# Patient Record
Sex: FEMALE | Race: WHITE | URBAN - METROPOLITAN AREA
[De-identification: names, ages, dates, MRNs, and addresses within clinical notes are randomized per-mention and may not be internally consistent; named-entity substitution may affect disease eponyms.]

---

## 2022-02-28 ENCOUNTER — OFFICE VISIT (OUTPATIENT)
Dept: PRIMARY CARE CLINIC | Age: 37
End: 2022-02-28
Payer: COMMERCIAL

## 2022-02-28 VITALS
WEIGHT: 185 LBS | SYSTOLIC BLOOD PRESSURE: 124 MMHG | OXYGEN SATURATION: 98 % | DIASTOLIC BLOOD PRESSURE: 72 MMHG | HEIGHT: 66 IN | HEART RATE: 112 BPM | BODY MASS INDEX: 29.73 KG/M2 | TEMPERATURE: 97.1 F

## 2022-02-28 DIAGNOSIS — Z11.59 NEED FOR HEPATITIS C SCREENING TEST: ICD-10-CM

## 2022-02-28 DIAGNOSIS — R20.0 NUMBNESS AND TINGLING IN BOTH HANDS: ICD-10-CM

## 2022-02-28 DIAGNOSIS — Z00.00 ENCOUNTER FOR ANNUAL PHYSICAL EXAM: ICD-10-CM

## 2022-02-28 DIAGNOSIS — M72.2 PLANTAR FASCIITIS, BILATERAL: ICD-10-CM

## 2022-02-28 DIAGNOSIS — R20.2 NUMBNESS AND TINGLING IN BOTH HANDS: ICD-10-CM

## 2022-02-28 DIAGNOSIS — Z11.4 SCREENING FOR HIV (HUMAN IMMUNODEFICIENCY VIRUS): ICD-10-CM

## 2022-02-28 DIAGNOSIS — Z00.00 ENCOUNTER FOR ANNUAL PHYSICAL EXAM: Primary | ICD-10-CM

## 2022-02-28 DIAGNOSIS — M54.50 ACUTE BILATERAL LOW BACK PAIN WITHOUT SCIATICA: ICD-10-CM

## 2022-02-28 LAB
ANION GAP SERPL CALCULATED.3IONS-SCNC: 16 MMOL/L (ref 3–16)
BUN BLDV-MCNC: 14 MG/DL (ref 7–20)
CALCIUM SERPL-MCNC: 9.7 MG/DL (ref 8.3–10.6)
CHLORIDE BLD-SCNC: 100 MMOL/L (ref 99–110)
CHOLESTEROL, TOTAL: 206 MG/DL (ref 0–199)
CO2: 24 MMOL/L (ref 21–32)
CREAT SERPL-MCNC: 0.8 MG/DL (ref 0.6–1.1)
GFR AFRICAN AMERICAN: >60
GFR NON-AFRICAN AMERICAN: >60
GLUCOSE BLD-MCNC: 85 MG/DL (ref 70–99)
HDLC SERPL-MCNC: 39 MG/DL (ref 40–60)
HEPATITIS C ANTIBODY INTERPRETATION: NORMAL
LDL CHOLESTEROL CALCULATED: 145 MG/DL
POTASSIUM SERPL-SCNC: 4.4 MMOL/L (ref 3.5–5.1)
SODIUM BLD-SCNC: 140 MMOL/L (ref 136–145)
TRIGL SERPL-MCNC: 112 MG/DL (ref 0–150)
VLDLC SERPL CALC-MCNC: 22 MG/DL

## 2022-02-28 PROCEDURE — 99385 PREV VISIT NEW AGE 18-39: CPT | Performed by: STUDENT IN AN ORGANIZED HEALTH CARE EDUCATION/TRAINING PROGRAM

## 2022-02-28 SDOH — ECONOMIC STABILITY: FOOD INSECURITY: WITHIN THE PAST 12 MONTHS, YOU WORRIED THAT YOUR FOOD WOULD RUN OUT BEFORE YOU GOT MONEY TO BUY MORE.: NEVER TRUE

## 2022-02-28 SDOH — ECONOMIC STABILITY: FOOD INSECURITY: WITHIN THE PAST 12 MONTHS, THE FOOD YOU BOUGHT JUST DIDN'T LAST AND YOU DIDN'T HAVE MONEY TO GET MORE.: NEVER TRUE

## 2022-02-28 ASSESSMENT — PATIENT HEALTH QUESTIONNAIRE - PHQ9
SUM OF ALL RESPONSES TO PHQ QUESTIONS 1-9: 1
SUM OF ALL RESPONSES TO PHQ9 QUESTIONS 1 & 2: 1
SUM OF ALL RESPONSES TO PHQ QUESTIONS 1-9: 1
2. FEELING DOWN, DEPRESSED OR HOPELESS: 1
1. LITTLE INTEREST OR PLEASURE IN DOING THINGS: 0

## 2022-02-28 ASSESSMENT — SOCIAL DETERMINANTS OF HEALTH (SDOH): HOW HARD IS IT FOR YOU TO PAY FOR THE VERY BASICS LIKE FOOD, HOUSING, MEDICAL CARE, AND HEATING?: NOT HARD AT ALL

## 2022-02-28 NOTE — PROGRESS NOTES
2022    Jose Xiao (:  1985) is a 39 y.o. female, here for a preventive medicine evaluation. There is no problem list on file for this patient. No chronic illnesses has 5 kids  is peds anesthesiologist.   Couple months ago pain in both hands, also when she steps out of bed in morning pain in both feet,   Dad had carpal tunnel  Wears wrist braces at night and finger pain went away,   Numbness in both wrists    Review of Systems   All other systems reviewed and are negative. Prior to Visit Medications    Medication Sig Taking? Authorizing Provider   levonorgestrel (MIRENA, 52 MG,) IUD 52 mg 1 each by IntraUTERine route once Yes Historical Provider, MD        No Known Allergies    History reviewed. No pertinent past medical history. History reviewed. No pertinent surgical history. Social History     Socioeconomic History    Marital status: Not on file     Spouse name: Not on file    Number of children: Not on file    Years of education: Not on file    Highest education level: Not on file   Occupational History    Not on file   Tobacco Use    Smoking status: Never Smoker    Smokeless tobacco: Never Used   Vaping Use    Vaping Use: Never used   Substance and Sexual Activity    Alcohol use: Not Currently    Drug use: Not Currently    Sexual activity: Not on file   Other Topics Concern    Not on file   Social History Narrative    Not on file     Social Determinants of Health     Financial Resource Strain: Low Risk     Difficulty of Paying Living Expenses: Not hard at all   Food Insecurity: No Food Insecurity    Worried About [address] in the Last Year: Never true    [address] in the Last Year: Never true   Transportation Needs:     Lack of Transportation (Medical): Not on file    Lack of Transportation (Non-Medical):  Not on file   Physical Activity:     Days of Exercise per Week: Not on file    Minutes of Exercise per Session: Not on file Stress:     Feeling of Stress : Not on file   Social Connections:     Frequency of Communication with Friends and Family: Not on file    Frequency of Social Gatherings with Friends and Family: Not on file    Attends Mosque Services: Not on file    Active Member of Clubs or Organizations: Not on file    Attends Club or Organization Meetings: Not on file    Marital Status: Not on file   Intimate Partner Violence:     Fear of Current or Ex-Partner: Not on file    Emotionally Abused: Not on file    Physically Abused: Not on file    Sexually Abused: Not on file   Housing Stability:     Unable to Pay for Housing in the Last Year: Not on file    Number of [place] in the Last Year: Not on file    Unstable Housing in the Last Year: Not on file        Family History   Problem Relation Age of Onset    Hypertension Father     Other Father        ADVANCE DIRECTIVE: N, <no information>    Vitals:    02/28/22 0935   BP: 124/72   Site: Left Upper Arm   Position: Sitting   Cuff Size: Medium Adult   Pulse: 112   Temp: 97.1 °F (36.2 °C)   TempSrc: Infrared   SpO2: 98%   Weight: 185 lb (83.9 kg)   Height: 5' 6\" (1.676 m)     Estimated body mass index is 29.86 kg/m² as calculated from the following:    Height as of this encounter: 5' 6\" (1.676 m). Weight as of this encounter: 185 lb (83.9 kg). Physical Exam  Vitals reviewed. Constitutional:       General: She is not in acute distress. Appearance: Normal appearance. She is not ill-appearing. HENT:      Head: Normocephalic and atraumatic. Right Ear: Tympanic membrane, ear canal and external ear normal.      Left Ear: Tympanic membrane, ear canal and external ear normal.      Nose: Nose normal. No rhinorrhea. Mouth/Throat:      Mouth: Mucous membranes are moist.      Pharynx: Oropharynx is clear. No oropharyngeal exudate. Eyes:      General: No scleral icterus. Right eye: No discharge. Left eye: No discharge. Extraocular Movements: Extraocular movements intact. Conjunctiva/sclera: Conjunctivae normal.   Cardiovascular:      Rate and Rhythm: Normal rate and regular rhythm. Pulses: Normal pulses. Heart sounds: Normal heart sounds. No murmur heard. No gallop. Pulmonary:      Effort: Pulmonary effort is normal.      Breath sounds: Normal breath sounds. No wheezing, rhonchi or rales. Abdominal:      General: Abdomen is flat. Bowel sounds are normal. There is no distension. Palpations: Abdomen is soft. There is no mass. Musculoskeletal:         General: Normal range of motion. Cervical back: Neck supple. No muscular tenderness. Comments: TTP along plantar fascia bilateral feet  Tight heel cords bilaterally    TTP paralumbar region  Negative straight leg raise bilaterally  Hip flexion right hip to 70 degrees  Hip flexion left hip greater 90 degrees  Normal hip abduction/abduction  Negative logroll bilaterally   Lymphadenopathy:      Cervical: No cervical adenopathy. Skin:     General: Skin is warm. Capillary Refill: Capillary refill takes less than 2 seconds. Findings: No rash. Neurological:      General: No focal deficit present. Mental Status: She is alert. Cranial Nerves: No cranial nerve deficit. Psychiatric:         Mood and Affect: Mood normal.         Behavior: Behavior normal.         No flowsheet data found. No results found for: CHOL, CHOLFAST, TRIG, TRIGLYCFAST, HDL, LDLCHOLESTEROL, LDLCALC, GLUF, GLUCOSE, LABA1C    The ASCVD Risk score (Fawn Bragg., et al., 2013) failed to calculate for the following reasons:     The 2013 ASCVD risk score is only valid for ages 36 to 78    Immunization History   Administered Date(s) Administered    COVID-19, Sima Lopez, Primary or Immunocompromised, PF, 100mcg/0.5mL 04/03/2021, 05/01/2021       Health Maintenance   Topic Date Due    Hepatitis C screen  Never done    Varicella vaccine (1 of 2 - 2-dose childhood series) Never done    Depression Screen  Never done    HIV screen  Never done    Cervical cancer screen  Never done    Flu vaccine (1) Never done    COVID-19 Vaccine (3 - Booster for Moderna series) 10/01/2021    DTaP/Tdap/Td vaccine (3 - Td or Tdap) 02/18/2030    Hepatitis A vaccine  Aged Out    Hepatitis B vaccine  Aged Out    Hib vaccine  Aged Out    Meningococcal (ACWY) vaccine  Aged Out    Pneumococcal 0-64 years Vaccine  Aged Out       Assessment & Plan   Encounter for annual physical exam  -     Basic Metabolic Panel; Future  -     Lipid Panel; Future  Screening for HIV (human immunodeficiency virus)  -     HIV Screen; Future  Need for hepatitis C screening test  -     Hepatitis C Antibody; Future  Numbness and tingling in both hands  Chronic and worsening  Likely secondary to carpal tunnel  -     [facility] - Clay Lechuga MD (Inpatient and Outpatient EMG), [facility]  Plantar fasciitis, bilateral  Acute  NSAIDs, ice, stretch heel cords    Acute bilateral low back pain without sciatica  Gave handout for low back and hip conditioning program.  NSAIDs, heating pad if no improvement a month follow-up    Return in about 4 weeks (around 3/28/2022), or if symptoms worsen or fail to improve.          --Todd Martinez MD

## 2022-03-01 LAB
HIV AG/AB: NORMAL
HIV ANTIGEN: NORMAL
HIV-1 ANTIBODY: NORMAL
HIV-2 AB: NORMAL

## 2022-03-17 ENCOUNTER — TELEPHONE (OUTPATIENT)
Dept: PRIMARY CARE CLINIC | Age: 37
End: 2022-03-17

## 2022-03-17 DIAGNOSIS — E78.2 MIXED HYPERLIPIDEMIA: Primary | ICD-10-CM

## 2022-03-31 ENCOUNTER — PROCEDURE VISIT (OUTPATIENT)
Dept: NEUROLOGY | Age: 37
End: 2022-03-31
Payer: COMMERCIAL

## 2022-03-31 DIAGNOSIS — G56.23 ENTRAPMENT OF BOTH ULNAR NERVES AT ELBOW: Primary | ICD-10-CM

## 2022-03-31 PROCEDURE — 95886 MUSC TEST DONE W/N TEST COMP: CPT | Performed by: PSYCHIATRY & NEUROLOGY

## 2022-03-31 PROCEDURE — 95911 NRV CNDJ TEST 9-10 STUDIES: CPT | Performed by: PSYCHIATRY & NEUROLOGY

## 2022-03-31 NOTE — PATIENT INSTRUCTIONS
Verbal consent was obtained from patient and/or patient's advocate for in office procedure with Dr. Victor M Carreno (EMG or EEG).

## 2022-03-31 NOTE — PROGRESS NOTES
Elmer Antoine M.D. [facility]) Physicians/[facility]  Board Certified in [address], [address] [place] [address]    EMG / NERVE CONDUCTION STUDY      PATIENT:  Tim Dorsey       DATE OF EM22     YOB: 1985       REASON FOR EMG:   Bilateral arm numbness      REFERRING PHYSICIAN:  Sona Talley MD  [address] [place] [address],  [facility]     SUMMARY:   Bilateral median motor nerve studies were normal.  Bilateral median sensory nerve studies were normal.  Bilateral ulnar motor nerve studies with slowing of conduction velocities across the elbow. Bilateral ulnar sensory nerve studies were normal.  The left radial sensory nerve study was normal.  Needle EMG of bilateral upper extremities was normal.      CLINICAL DIAGNOSIS:  Unspecified neuropathy        EMG RESULTS:   This patient has mild bilateral ulnar nerve lesions at the elbow. The right side is slightly more involved when compared to the left side. ---------------------------------------------  Elmer Antoine M.D.   Electromyographer / Neurologist

## 2022-04-06 ENCOUNTER — OFFICE VISIT (OUTPATIENT)
Dept: PRIMARY CARE CLINIC | Age: 37
End: 2022-04-06
Payer: COMMERCIAL

## 2022-04-06 VITALS
TEMPERATURE: 97.1 F | HEART RATE: 77 BPM | DIASTOLIC BLOOD PRESSURE: 74 MMHG | OXYGEN SATURATION: 99 % | SYSTOLIC BLOOD PRESSURE: 122 MMHG | BODY MASS INDEX: 29.38 KG/M2 | WEIGHT: 182 LBS

## 2022-04-06 DIAGNOSIS — G56.23 CUBITAL TUNNEL SYNDROME OF BOTH UPPER EXTREMITIES: ICD-10-CM

## 2022-04-06 DIAGNOSIS — K64.9 HEMORRHOIDS, UNSPECIFIED HEMORRHOID TYPE: Primary | ICD-10-CM

## 2022-04-06 PROCEDURE — 99213 OFFICE O/P EST LOW 20 MIN: CPT | Performed by: STUDENT IN AN ORGANIZED HEALTH CARE EDUCATION/TRAINING PROGRAM

## 2022-04-06 NOTE — PROGRESS NOTES
Rosie Aragon (:  1985) is a 39 y.o. female,Established patient, here for evaluation of the following chief complaint(s):  Hemorrhoids         ASSESSMENT/PLAN:  1. Hemorrhoids, unspecified hemorrhoid type  Assessment & Plan:   External hemorrhoid noted on exam today was not thrombosed did not notice any evidence of infection either  Can use conservative management  She will let me know if she wants referral to colorectal surgeon  2. Cubital tunnel syndrome of both upper extremities  Assessment & Plan:   Reviewed EMG with patient today  Showed mild ulnar nerve lesions bilaterally  Discussed different options, recommend splinting at night with Ace wraps, not leaning on elbows, sleep with arms above head etc.  Patient will try this and if no improvement she will call me and I will put in referral for hand surgeon      Return in about 4 weeks (around 2022), or if symptoms worsen or fail to improve. Subjective   SUBJECTIVE/OBJECTIVE:  HPI    Has had 10 days of hemorrhoids and usually will pop up after sex, \"doggy style\" or if legs are back over head, no constipation, states anal sex is infrequent. Tried to push it back in and this made it worse, was using preparation H for one week and some improvement. No bleeding or itching. Also had EMG done still having moderate pain and weakness in bilateral hands especially with holding phone or with gripping. Review of Systems   All other systems reviewed and are negative. Objective   Physical Exam  Vitals reviewed. Constitutional:       General: She is not in acute distress. Appearance: Normal appearance. She is not ill-appearing. HENT:      Head: Normocephalic and atraumatic. Right Ear: External ear normal.      Left Ear: External ear normal.   Eyes:      General: No scleral icterus. Right eye: No discharge. Left eye: No discharge. Extraocular Movements: Extraocular movements intact. Conjunctiva/sclera: Conjunctivae normal.   Pulmonary:      Effort: Pulmonary effort is normal. No respiratory distress. Genitourinary:     Comments: Nonthrombosed small external hemorrhoid at 6 o'clock position  Small internal hemorrhoid noted at the 3 o'clock position  No gross blood or stool  Musculoskeletal:      Cervical back: Neck supple. Skin:     Coloration: Skin is not jaundiced. Findings: No lesion or rash. Neurological:      Mental Status: She is alert. Cranial Nerves: No cranial nerve deficit. Coordination: Coordination normal.   Psychiatric:         Mood and Affect: Mood normal.     Chaperone was offered patient declined. An electronic signature was used to authenticate this note.     --Hubert Mendez MD

## 2022-04-06 NOTE — ASSESSMENT & PLAN NOTE
Reviewed EMG with patient today  Showed mild ulnar nerve lesions bilaterally  Discussed different options, recommend splinting at night with Ace wraps, not leaning on elbows, sleep with arms above head etc.  Patient will try this and if no improvement she will call me and I will put in referral for hand surgeon

## 2022-04-06 NOTE — ASSESSMENT & PLAN NOTE
External hemorrhoid noted on exam today was not thrombosed did not notice any evidence of infection either  Can use conservative management  She will let me know if she wants referral to colorectal surgeon

## 2022-12-22 ENCOUNTER — OFFICE VISIT (OUTPATIENT)
Dept: PRIMARY CARE CLINIC | Age: 37
End: 2022-12-22
Payer: COMMERCIAL

## 2022-12-22 VITALS
WEIGHT: 176.2 LBS | OXYGEN SATURATION: 96 % | BODY MASS INDEX: 28.44 KG/M2 | HEART RATE: 82 BPM | SYSTOLIC BLOOD PRESSURE: 102 MMHG | DIASTOLIC BLOOD PRESSURE: 76 MMHG | TEMPERATURE: 97.2 F

## 2022-12-22 DIAGNOSIS — R41.840 ATTENTION DEFICIT: ICD-10-CM

## 2022-12-22 DIAGNOSIS — R41.840 ATTENTION DEFICIT: Primary | ICD-10-CM

## 2022-12-22 LAB
BASOPHILS ABSOLUTE: 0 K/UL (ref 0–0.2)
BASOPHILS RELATIVE PERCENT: 0.3 %
EOSINOPHILS ABSOLUTE: 0.1 K/UL (ref 0–0.6)
EOSINOPHILS RELATIVE PERCENT: 1.9 %
FERRITIN: 59.1 NG/ML (ref 15–150)
HCT VFR BLD CALC: 38.8 % (ref 36–48)
HEMOGLOBIN: 13.1 G/DL (ref 12–16)
IRON SATURATION: 28 % (ref 15–50)
IRON: 88 UG/DL (ref 37–145)
LYMPHOCYTES ABSOLUTE: 2 K/UL (ref 1–5.1)
LYMPHOCYTES RELATIVE PERCENT: 27.2 %
MCH RBC QN AUTO: 30.2 PG (ref 26–34)
MCHC RBC AUTO-ENTMCNC: 33.8 G/DL (ref 31–36)
MCV RBC AUTO: 89.4 FL (ref 80–100)
MONOCYTES ABSOLUTE: 0.3 K/UL (ref 0–1.3)
MONOCYTES RELATIVE PERCENT: 4.7 %
NEUTROPHILS ABSOLUTE: 4.7 K/UL (ref 1.7–7.7)
NEUTROPHILS RELATIVE PERCENT: 65.9 %
PDW BLD-RTO: 13.3 % (ref 12.4–15.4)
PLATELET # BLD: 264 K/UL (ref 135–450)
PMV BLD AUTO: 9.3 FL (ref 5–10.5)
RBC # BLD: 4.34 M/UL (ref 4–5.2)
T4 FREE: 1.2 NG/DL (ref 0.9–1.8)
TOTAL IRON BINDING CAPACITY: 320 UG/DL (ref 260–445)
TSH SERPL DL<=0.05 MIU/L-ACNC: 1.06 UIU/ML (ref 0.27–4.2)
WBC # BLD: 7.2 K/UL (ref 4–11)

## 2022-12-22 PROCEDURE — 99213 OFFICE O/P EST LOW 20 MIN: CPT | Performed by: STUDENT IN AN ORGANIZED HEALTH CARE EDUCATION/TRAINING PROGRAM

## 2022-12-22 RX ORDER — ICOSAPENT ETHYL 1000 MG/1
2 CAPSULE ORAL 2 TIMES DAILY
Qty: 60 CAPSULE | Refills: 3 | Status: SHIPPED | OUTPATIENT
Start: 2022-12-22 | End: 2022-12-22

## 2022-12-22 NOTE — PROGRESS NOTES
Mirella Benítez (:  1985) is a 40 y.o. female,Established patient, here for evaluation of the following chief complaint(s):  ADHD (Patient has a suspicion of ADHD. Patient has been researching for her son, thus she believes she has a lot of the symptoms. )         ASSESSMENT/PLAN:  1. Attention deficit  -     CBC with Auto Differential; Future  -     TSH; Future  -     T4, Free; Future  -     Ferritin; Future  -     Iron and TIBC; Future  Likely related to anxiety but will need formal eval and likely has ADHD as well. Gave list of places that do formal ADHD evals. Can f/u after    Patient is aware that they will need to have a formal evaluation for ADHD  Discussed today that treatment for ADHD typically are stimulants, risks versus benefits gone over with patient today. Patient is aware that they will need to sign a drug contract with this office to be updated yearly, drug screen is required before medication is started and at least yearly but we reserve the right to drug screen randomly. Once formal evaluation is received and reviewed and drug screen appropriate, can start medication at that time. Patient is aware that they will need to follow-up 1 month after starting medication and at least every 3 months for medication refills. Patient is also aware that if any medication regimen is changed/adjusted they will need to follow-up monthly. Return for after adhd eval.         Subjective   SUBJECTIVE/OBJECTIVE:  HPI    Loses keys   Procrastinates, will worry about something and put it off until she doesn't do it at all  Loses every day items  Mind frequently wanders  Cannot focus during meetings  Sometimes trouble falling asleep due to things running through head    Review of Systems   All other systems reviewed and are negative. Objective   Physical Exam  Vitals reviewed. Constitutional:       General: She is not in acute distress. Appearance: Normal appearance.  She is not ill-appearing. HENT:      Head: Normocephalic and atraumatic. Right Ear: External ear normal.      Left Ear: External ear normal.   Eyes:      General: No scleral icterus. Right eye: No discharge. Left eye: No discharge. Extraocular Movements: Extraocular movements intact. Conjunctiva/sclera: Conjunctivae normal.   Pulmonary:      Effort: Pulmonary effort is normal. No respiratory distress. Musculoskeletal:      Cervical back: Neck supple. Skin:     Coloration: Skin is not jaundiced. Findings: No lesion or rash. Neurological:      Mental Status: She is alert. Cranial Nerves: No cranial nerve deficit. Coordination: Coordination normal.   Psychiatric:         Mood and Affect: Mood normal.                An electronic signature was used to authenticate this note.     --Mercy Walton MD

## 2022-12-22 NOTE — PATIENT INSTRUCTIONS
[address]  [address] Etienne  Antony Umana  Phone: [phone] [facility]   Accepts most insurances   Address: [address] [place] [address], [place] [address]   Phone: [phone]     [facility]   Often times self-pay and can be expensive, so ask about cost   [address] [place], [facility]   Phone: [phone] [facility]   [address]   [phone]     Pipe Bermudez, Ph.D.   [facility] [place] [address]   [phone]   Website does not mention insurances accepted       [address] OFFICE  [address]  Phone: [phone]     Fax: [address]  40 [facility],   [address] [place] [address]  Brigido Umana 3  Phone: [phone]     Fax: [phone]    [employer]  Call: [phone]  Large organization in numerous states   Easier to book online rather than call  Make sure to find therapist who does ADHD formal evaluations

## 2023-04-03 ENCOUNTER — TELEMEDICINE (OUTPATIENT)
Dept: PRIMARY CARE CLINIC | Age: 38
End: 2023-04-03
Payer: COMMERCIAL

## 2023-04-03 DIAGNOSIS — J06.9 UPPER RESPIRATORY TRACT INFECTION, UNSPECIFIED TYPE: Primary | ICD-10-CM

## 2023-04-03 PROCEDURE — 99213 OFFICE O/P EST LOW 20 MIN: CPT | Performed by: STUDENT IN AN ORGANIZED HEALTH CARE EDUCATION/TRAINING PROGRAM

## 2023-04-03 RX ORDER — AZITHROMYCIN 250 MG/1
250 TABLET, FILM COATED ORAL SEE ADMIN INSTRUCTIONS
Qty: 6 TABLET | Refills: 0 | Status: SHIPPED | OUTPATIENT
Start: 2023-04-03 | End: 2023-04-08

## 2023-04-03 RX ORDER — ALBUTEROL SULFATE 90 UG/1
2 AEROSOL, METERED RESPIRATORY (INHALATION) EVERY 6 HOURS PRN
Qty: 18 G | Refills: 3 | Status: SHIPPED | OUTPATIENT
Start: 2023-04-03

## 2023-04-03 RX ORDER — BROMPHENIRAMINE MALEATE, PSEUDOEPHEDRINE HYDROCHLORIDE, AND DEXTROMETHORPHAN HYDROBROMIDE 2; 30; 10 MG/5ML; MG/5ML; MG/5ML
10 SYRUP ORAL 4 TIMES DAILY PRN
Qty: 200 ML | Refills: 0 | Status: SHIPPED | OUTPATIENT
Start: 2023-04-03

## 2023-04-03 SDOH — ECONOMIC STABILITY: FOOD INSECURITY: WITHIN THE PAST 12 MONTHS, YOU WORRIED THAT YOUR FOOD WOULD RUN OUT BEFORE YOU GOT MONEY TO BUY MORE.: NEVER TRUE

## 2023-04-03 SDOH — ECONOMIC STABILITY: INCOME INSECURITY: HOW HARD IS IT FOR YOU TO PAY FOR THE VERY BASICS LIKE FOOD, HOUSING, MEDICAL CARE, AND HEATING?: NOT HARD AT ALL

## 2023-04-03 SDOH — ECONOMIC STABILITY: TRANSPORTATION INSECURITY
IN THE PAST 12 MONTHS, HAS LACK OF TRANSPORTATION KEPT YOU FROM MEETINGS, WORK, OR FROM GETTING THINGS NEEDED FOR DAILY LIVING?: NO

## 2023-04-03 SDOH — ECONOMIC STABILITY: FOOD INSECURITY: WITHIN THE PAST 12 MONTHS, THE FOOD YOU BOUGHT JUST DIDN'T LAST AND YOU DIDN'T HAVE MONEY TO GET MORE.: NEVER TRUE

## 2023-04-03 SDOH — ECONOMIC STABILITY: HOUSING INSECURITY
IN THE LAST 12 MONTHS, WAS THERE A TIME WHEN YOU DID NOT HAVE A STEADY PLACE TO SLEEP OR SLEPT IN A SHELTER (INCLUDING NOW)?: NO

## 2023-04-03 NOTE — PROGRESS NOTES
Cynthia Shelley (:  1985) is a Established patient, here for evaluation of the following:    Assessment & Plan   Below is the assessment and plan developed based on review of pertinent history, physical exam, labs, studies, and medications. 1. Upper respiratory tract infection, unspecified type  -     azithromycin (ZITHROMAX) 250 MG tablet; Take 1 tablet by mouth See Admin Instructions for 5 days 500mg on day 1 followed by 250mg on days 2 - 5, Disp-6 tablet, R-0Normal  -     brompheniramine-pseudoephedrine-DM 2-30-10 MG/5ML syrup; Take 10 mLs by mouth 4 times daily as needed for Cough, Disp-200 mL, R-0Normal  -     albuterol sulfate HFA (PROVENTIL HFA) 108 (90 Base) MCG/ACT inhaler; Inhale 2 puffs into the lungs every 6 hours as needed for Wheezing, Disp-18 g, R-3Normal    Return if symptoms worsen or fail to improve. Subjective   HPI    Friday was having trouble breathing, has been sick on and off since Xmas, tons of congestion and cough.  made her breathe through a straw and it helped. Review of Systems   All other systems reviewed and are negative. Objective   Patient-Reported Vitals  No data recorded     Physical Exam  Vitals reviewed. Constitutional:       General: She is not in acute distress. Appearance: Normal appearance. She is not ill-appearing. HENT:      Head: Normocephalic and atraumatic. Right Ear: External ear normal.      Left Ear: External ear normal.   Eyes:      General: No scleral icterus. Right eye: No discharge. Left eye: No discharge. Extraocular Movements: Extraocular movements intact. Conjunctiva/sclera: Conjunctivae normal.   Pulmonary:      Effort: Pulmonary effort is normal. No respiratory distress. Musculoskeletal:      Cervical back: Neck supple. Skin:     Coloration: Skin is not jaundiced. Findings: No lesion or rash. Neurological:      Mental Status: She is alert.       Cranial Nerves: No cranial

## 2023-05-12 ENCOUNTER — OFFICE VISIT (OUTPATIENT)
Dept: PRIMARY CARE CLINIC | Age: 38
End: 2023-05-12
Payer: COMMERCIAL

## 2023-05-12 ENCOUNTER — HOSPITAL ENCOUNTER (OUTPATIENT)
Dept: VASCULAR LAB | Age: 38
Discharge: HOME | End: 2023-05-12
Payer: COMMERCIAL

## 2023-05-12 VITALS
HEIGHT: 66 IN | WEIGHT: 176 LBS | DIASTOLIC BLOOD PRESSURE: 70 MMHG | BODY MASS INDEX: 28.28 KG/M2 | OXYGEN SATURATION: 99 % | RESPIRATION RATE: 16 BRPM | TEMPERATURE: 96.8 F | SYSTOLIC BLOOD PRESSURE: 108 MMHG | HEART RATE: 99 BPM

## 2023-05-12 DIAGNOSIS — M79.89 PAIN AND SWELLING OF LEFT LOWER LEG: ICD-10-CM

## 2023-05-12 DIAGNOSIS — M79.662 PAIN AND SWELLING OF LEFT LOWER LEG: ICD-10-CM

## 2023-05-12 DIAGNOSIS — M79.89 PAIN AND SWELLING OF LEFT LOWER LEG: Primary | ICD-10-CM

## 2023-05-12 DIAGNOSIS — S80.12XA CONTUSION OF LEFT LOWER LEG, INITIAL ENCOUNTER: ICD-10-CM

## 2023-05-12 DIAGNOSIS — M79.662 PAIN AND SWELLING OF LEFT LOWER LEG: Primary | ICD-10-CM

## 2023-05-12 PROCEDURE — 93971 EXTREMITY STUDY: CPT

## 2023-05-12 PROCEDURE — 99214 OFFICE O/P EST MOD 30 MIN: CPT | Performed by: STUDENT IN AN ORGANIZED HEALTH CARE EDUCATION/TRAINING PROGRAM

## 2023-05-12 ASSESSMENT — PATIENT HEALTH QUESTIONNAIRE - PHQ9
SUM OF ALL RESPONSES TO PHQ QUESTIONS 1-9: 0
2. FEELING DOWN, DEPRESSED OR HOPELESS: 0
SUM OF ALL RESPONSES TO PHQ9 QUESTIONS 1 & 2: 0
SUM OF ALL RESPONSES TO PHQ QUESTIONS 1-9: 0
1. LITTLE INTEREST OR PLEASURE IN DOING THINGS: 0
SUM OF ALL RESPONSES TO PHQ QUESTIONS 1-9: 0
SUM OF ALL RESPONSES TO PHQ QUESTIONS 1-9: 0

## 2023-05-12 NOTE — PROGRESS NOTES
[address] [facility]  [place] [address] [address] [facility] [address]  Dept: [phone]  Dept Fax: [phone]  [address] [address] [facility] Daquan is a 40 y.o. female who presents today for:  Chief Complaint   Patient presents with    Leg Swelling     Possible blood clot, left leg and bruising. HPI:   Karen Bernstein is 40 y.o. with a history of cubital tunnel syndrome who presents today for left lower leg swelling that started yesterday. Red, painful, swollen. No history of DVT/PE. No SOB. Not on OCP. Not a smoker. No family history of DVT.        Objective:     Vitals:    05/12/23 1336   BP: 108/70   Pulse: 99   Resp: 16   Temp: 96.8 °F (36 °C)   SpO2: 99%         Wt Readings from Last 3 Encounters:   05/12/23 176 lb (79.8 kg)   12/22/22 176 lb 3.2 oz (79.9 kg)   04/06/22 182 lb (82.6 kg)       BP Readings from Last 3 Encounters:   05/12/23 108/70   12/22/22 102/76   04/06/22 122/74       Lab Results   Component Value Date    WBC 7.2 12/22/2022    HGB 13.1 12/22/2022    HCT 38.8 12/22/2022    MCV 89.4 12/22/2022     12/22/2022     Lab Results   Component Value Date     02/28/2022    K 4.4 02/28/2022     02/28/2022    CO2 24 02/28/2022    BUN 14 02/28/2022    CREATININE 0.8 02/28/2022    GLUCOSE 85 02/28/2022    CALCIUM 9.7 02/28/2022    LABGLOM >60 02/28/2022    GFRAA >60 02/28/2022     Lab Results   Component Value Date    TSH 1.06 12/22/2022    T4FREE 1.2 12/22/2022     No results found for: LABA1C  No results found for: EAG  Lab Results   Component Value Date    CHOL 206 (H) 02/28/2022     Lab Results   Component Value Date    TRIG 112 02/28/2022     Lab Results   Component Value Date    HDL 39 (L) 02/28/2022     Lab Results   Component Value Date    LDLCALC 145 (H) 02/28/2022     No results found for: PSA, PSADIA  No results found for: [id number]  No results found for: VITD25       No results found for: LABMICR, [id number]        Extensive

## 2023-05-17 ENCOUNTER — PATIENT MESSAGE (OUTPATIENT)
Dept: PRIMARY CARE CLINIC | Age: 38
End: 2023-05-17

## 2023-05-17 NOTE — TELEPHONE ENCOUNTER
From: Sabine Escobar  To: Dr. Ricci Lipps: 5/17/2023 11:05 AM EDT  Subject: Follow up    Hi there! I'm just checking in to see if I was supposed to go to the lab? I didn't get a mychart notification that anything was ordered so I just wanted to be sure. My leg is definitely looking better. The bruise is slowly improving and there has been no new bruising. There is still pain at the initial site. I appreciate your input. Thanks!     Brittany Waterman

## 2024-01-10 ENCOUNTER — OFFICE VISIT (OUTPATIENT)
Dept: PRIMARY CARE CLINIC | Age: 39
End: 2024-01-10
Payer: COMMERCIAL

## 2024-01-10 ENCOUNTER — HOSPITAL ENCOUNTER (OUTPATIENT)
Dept: GENERAL RADIOLOGY | Age: 39
Discharge: HOME | End: 2024-01-10
Payer: COMMERCIAL

## 2024-01-10 VITALS
TEMPERATURE: 97.1 F | BODY MASS INDEX: 29.87 KG/M2 | SYSTOLIC BLOOD PRESSURE: 116 MMHG | HEART RATE: 99 BPM | WEIGHT: 185.05 LBS | OXYGEN SATURATION: 98 % | DIASTOLIC BLOOD PRESSURE: 78 MMHG

## 2024-01-10 DIAGNOSIS — G89.29 CHRONIC LEFT SHOULDER PAIN: Primary | ICD-10-CM

## 2024-01-10 DIAGNOSIS — M25.512 CHRONIC LEFT SHOULDER PAIN: ICD-10-CM

## 2024-01-10 DIAGNOSIS — M25.512 CHRONIC LEFT SHOULDER PAIN: Primary | ICD-10-CM

## 2024-01-10 DIAGNOSIS — G89.29 CHRONIC LEFT SHOULDER PAIN: ICD-10-CM

## 2024-01-10 DIAGNOSIS — G89.29 CHRONIC BILATERAL LOW BACK PAIN WITHOUT SCIATICA: ICD-10-CM

## 2024-01-10 DIAGNOSIS — Z23 NEED FOR INFLUENZA VACCINATION: ICD-10-CM

## 2024-01-10 DIAGNOSIS — M54.50 CHRONIC BILATERAL LOW BACK PAIN WITHOUT SCIATICA: ICD-10-CM

## 2024-01-10 PROCEDURE — 73030 X-RAY EXAM OF SHOULDER: CPT

## 2024-01-10 PROCEDURE — 90674 CCIIV4 VAC NO PRSV 0.5 ML IM: CPT | Performed by: STUDENT IN AN ORGANIZED HEALTH CARE EDUCATION/TRAINING PROGRAM

## 2024-01-10 PROCEDURE — 90471 IMMUNIZATION ADMIN: CPT | Performed by: STUDENT IN AN ORGANIZED HEALTH CARE EDUCATION/TRAINING PROGRAM

## 2024-01-10 PROCEDURE — 99213 OFFICE O/P EST LOW 20 MIN: CPT | Performed by: STUDENT IN AN ORGANIZED HEALTH CARE EDUCATION/TRAINING PROGRAM

## 2024-01-10 RX ORDER — RUXOLITINIB 15 MG/G
CREAM TOPICAL
COMMUNITY
Start: 2024-01-07

## 2024-01-10 ASSESSMENT — PATIENT HEALTH QUESTIONNAIRE - PHQ9
SUM OF ALL RESPONSES TO PHQ9 QUESTIONS 1 & 2: 0
2. FEELING DOWN, DEPRESSED OR HOPELESS: 0
SUM OF ALL RESPONSES TO PHQ QUESTIONS 1-9: 0
SUM OF ALL RESPONSES TO PHQ QUESTIONS 1-9: 0
1. LITTLE INTEREST OR PLEASURE IN DOING THINGS: 0
SUM OF ALL RESPONSES TO PHQ QUESTIONS 1-9: 0
SUM OF ALL RESPONSES TO PHQ QUESTIONS 1-9: 0

## 2024-01-10 NOTE — PROGRESS NOTES
Elizabeth Kingston (:  1985) is a 38 y.o. female,Established patient, here for evaluation of the following chief complaint(s):  Back Pain and Shoulder Pain (Left shoulder pain)         ASSESSMENT/PLAN:  1. Chronic left shoulder pain  -     XR SHOULDER LEFT (MIN 2 VIEWS); Future  -     [facility]  2. Chronic bilateral low back pain without sciatica  -     [facility]  3. Need for influenza vaccination  -     Influenza, FLUCELVAX, (age 6 mo+), IM, Preservative Free, 0.5 mL  NSAID's heating pad/ice    Return if symptoms worsen or fail to improve.         Subjective   SUBJECTIVE/OBJECTIVE:  HPI    Over a year ago did a burpie and heard something pop and since then it hasn't been right, will have pain intermittently.   Taking off shirt or fastening bra in back will hurt   Anterior shoulder pain     About 2 weeks ago 3 days of constant dull ache    Lower back pain forever and started seeing chiropractor     Review of Systems   All other systems reviewed and are negative.         Objective   Physical Exam  Constitutional:       Appearance: Normal appearance.   HENT:      Head: Normocephalic and atraumatic.      Nose: Nose normal.      Mouth/Throat:      Mouth: Mucous membranes are moist.   Eyes:      Extraocular Movements: Extraocular movements intact.   Cardiovascular:      Rate and Rhythm: Normal rate and regular rhythm.      Heart sounds: Normal heart sounds. No murmur heard.     No friction rub. No gallop.   Pulmonary:      Effort: Pulmonary effort is normal.      Breath sounds: Normal breath sounds. No wheezing, rhonchi or rales.   Musculoskeletal:      Comments: Mild TTP anterior left shoulder  MAEW    Larry SLR BL     Skin:     General: Skin is warm.   Neurological:      General: No focal deficit present.      Mental Status: She is alert.   Psychiatric:         Mood and Affect: Mood normal.                  An electronic

## 2024-01-18 ENCOUNTER — HOSPITAL ENCOUNTER (OUTPATIENT)
Dept: PHYSICAL THERAPY | Age: 39
Setting detail: THERAPIES SERIES
Discharge: HOME | End: 2024-01-18
Payer: COMMERCIAL

## 2024-01-18 DIAGNOSIS — M25.512 CHRONIC LEFT SHOULDER PAIN: Primary | ICD-10-CM

## 2024-01-18 DIAGNOSIS — G89.29 CHRONIC LEFT SHOULDER PAIN: Primary | ICD-10-CM

## 2024-01-18 PROCEDURE — 97110 THERAPEUTIC EXERCISES: CPT

## 2024-01-18 PROCEDURE — 97161 PT EVAL LOW COMPLEX 20 MIN: CPT

## 2024-01-18 NOTE — FLOWSHEET NOTE
[facility]- Outpatient Rehabilitation and Therapy [address], [place] [address] office: [phone] fax: [phone]      Physical Therapy: TREATMENT/PROGRESS NOTE   Patient: Elizabeth Kingston (38 y.o. female)   Treatment Date: 2024   :  1985 MRN: [id number]   Visit #: 1   Insurance Allowable Auth Needed   BCBS []Yes    []No    Insurance: Payor: BCBS / Plan: BCBS - [place] PPO / Product Type: *No Product type* /   Insurance ID: [id number] - ([place] [employer])  Secondary Insurance (if applicable):    Treatment Diagnosis:     ICD-10-CM    1. Chronic left shoulder pain  M25.512     G89.29          Medical Diagnosis:    Chronic left shoulder pain [M25.512, G89.29]  Chronic bilateral low back pain without sciatica [M54.50, G89.29]   Referring Physician: Yessenia Hernandez MD  PCP: Yessenia Hernandez MD                             Plan of care signed (Y/N):     Date of Patient follow up with Physician:      Progress Report/POC: EVAL today  POC update due: (10 visits /OR AUTH LIMITS, whichever is less)  2024     Preferred Language for Healthcare:   [x]English       []other:    SUBJECTIVE EXAMINATION     Patient Report/Comments: see eval     Test used Initial score  2024   Pain Summary VAS 0-6/10    Functional questionnaire Upper Extremity functional Scale NPV    Other:                OBJECTIVE EXAMINATION     Observation:     Test measurements: see eval    Exercises/Interventions:     Therapeutic Ex (74836)  Reps/Resistance Notes/Cues/Progressions   Bilateral ER 2 x 10 yellow     Scap squeeze 10x5\"     Cane ER 10x10\"    Table slides 10x10\"              Pt education  x15' Pt was educated on anatomy of area, plans for therapy, activity modification, low load, long duration stretches and monitoring symptoms                  Manual Intervention (85322)                              NMR re-education (52302) Resistance CUES NEEDED                            Therapeutic

## 2024-01-18 NOTE — PLAN OF CARE
Ext/triceps (0)          Pronation (80)          Supination (80)                       WRIST Flexion (60)          Extension (60)          RD (20)          UD (20)                                         MMT L MMT R Notes            CERVICAL Cerv flexion       Cerv extension       Cerv SB       Cerv rotation                 SHOULDER Flexion 4 pain 5     Abduction 4+ 4+     Supraspinatus  4- pain 4+      ER -0 4 pain 4+     ER -90       IR -0 4+ 4+     IR -90               ELBOW Flex/biceps       Ext/triceps       Pronation       supination                 WRIST Flexion       Extension       RD       UD                         Palpation:   Patient reported tenderness with palpation  Location: L Supraspinatus muscle belly into distal insertion and bicipital groove    Posture:   WNL    Bandages/Dressings/Incisions:  Not Applicable    Dermatomes: Abnormal findings listed below  NT    Myotomes: Abnormal findings listed below  NT    Reflexes: Abnormal findings listed below  Not tested    Specific Joint Mobility Testing/Accessory Motions:      Glenohumeral joint: WNL    Gait:    Pattern: WNL  Assistive Device Used: no AD    Special Tests:  [x] None Assessed   [] Following tests noted:    NT    Balance:  [x] WNL      [] NT       [] Dysfunction noted  Comment:     Falls Risk Assessment (30 days):   Falls Risk assessed and no intervention required.  Time Up and Go (TUG):   Not Assessed     ASSESSMENT   Assessment:   Elizabeth Kingston is a 38 y.o. female presenting today to Outpatient PT with signs and symptoms consistent with R RTC Tear.   Patient presents with the functional impairments and activity limitations listed below and would benefit from continued Outpatient PT to address the below impairments as well as improve pain, and restore function.     Functional Impairments:   Decreased UE functional ROM  Decreased UE functional strength  Decreased RC/scapular/core strength and neuromuscular control    Functional

## 2024-01-23 ENCOUNTER — APPOINTMENT (OUTPATIENT)
Dept: PHYSICAL THERAPY | Age: 39
End: 2024-01-23
Payer: COMMERCIAL

## 2024-01-30 ENCOUNTER — HOSPITAL ENCOUNTER (OUTPATIENT)
Dept: PHYSICAL THERAPY | Age: 39
Setting detail: THERAPIES SERIES
Discharge: HOME | End: 2024-01-30
Payer: COMMERCIAL

## 2024-01-30 DIAGNOSIS — M54.50 CHRONIC BILATERAL LOW BACK PAIN WITHOUT SCIATICA: Primary | ICD-10-CM

## 2024-01-30 DIAGNOSIS — G89.29 CHRONIC BILATERAL LOW BACK PAIN WITHOUT SCIATICA: Primary | ICD-10-CM

## 2024-01-30 PROCEDURE — 97110 THERAPEUTIC EXERCISES: CPT

## 2024-01-30 PROCEDURE — 97161 PT EVAL LOW COMPLEX 20 MIN: CPT

## 2024-01-30 PROCEDURE — 97140 MANUAL THERAPY 1/> REGIONS: CPT

## 2024-01-30 NOTE — FLOWSHEET NOTE
10% or less for the [place] Back Pain Disability Scale to assist with return top prior level of function.    Status: [] Progressing: [] Met: [] Not Met: [] Adjusted  Improve lumbar AROM Flexion, extension, rotation, and sidebend to WFL pain free to allow for proper joint functioning as indicated by patients functional deficits.  Status: [] Progressing: [] Met: [] Not Met: [] Adjusted  Pt to improve strength to 4+/5 or better of proximal hip and posterior chain Luigi allow for proper muscle and joint use in functional mobility, ADLs and prior level of function  Status: [] Progressing: [] Met: [] Not Met: [] Adjusted  Patient will return to Usual work, housework or activities, light home activities, and stand for length of time without increased symptoms or restriction to work towards return to prior level of function.  Status: [] Progressing: [] Met: [] Not Met: [] Adjusted  Patient will demonstrate the ability to lift 3 year old (approximately 30#) without pain or restriction.                                                  Status: [] Progressing: [] Met: [] Not Met: [] Adjusted    Overall Progression Towards Functional goals/ Treatment Progress Update:  [] Patient is progressing as expected towards functional goals listed.    [] Progression is slowed due to complexities/Impairments listed.  [] Progression has been slowed due to co-morbidities.  [x] Plan just implemented, too soon (<30days) to assess goals progression   [] Goals require adjustment due to lack of progress  [] Patient is not progressing as expected and requires additional follow up with physician  [] Other:     CHARGE CAPTURE     PT CHARGE GRID   CPT Code (TIMED) minutes # CPT Code (UNTIMED) #     Therex (70474)  15 1  EVAL:LOW (64891 - Typically 20 minutes face-to-face) 1    Neuromusc. Re-ed (62953)    Re-Eval (40525)     Manual (75105) 10 1  Estim Unattended (44515)     Ther. Act (24928)    Mech. Traction (21890)     Gait (72141)    Dry Needle 1-2

## 2024-01-30 NOTE — PLAN OF CARE
KNEE Flexion 4+ 4-     Extension 4+ 4-             ANKLE DF 5 5     PF 5 5     Inversion       Eversion        Repeated Movements: [x] Normal  [] Abnormal [] N/A    Palpation:   Patient reported tenderness with palpation  Location: bilateral lumbar paraspinals R > L, bilateral piriformis     Posture:   WNL    Bandages/Dressings/Incisions:  Not Applicable    Dermatomes: Abnormal findings listed below  NT    Myotomes: Abnormal findings listed below  NT    Reflexes: Abnormal findings listed below  L3-4 Patellar tendon 0  Clonus normal     Specific Joint Mobility Testing/Accessory Motions:      N/A    Special Tests:  N/A    Gait:    Pattern: WNL  Assistive Device Used: no AD    Balance:  [x] WNL      [] NT       [] Dysfunction noted  Comment:     Falls Risk Assessment (30 days):   Falls Risk assessed and no intervention required.  Time Up and Go (TUG):   Not Assessed     ASSESSMENT   Assessment:   Elizabeth Kingston is a 38 y.o. female presenting today to Outpatient PT with signs and symptoms consistent with chronic LBP.    Pt. presents with the functional impairments and activity limitations listed below and would benefit from Outpatient PT to address the below impairments as well as improve pain, and restore function.     Functional Impairments:   Decreased LE functional ROM  Decreased core/proximal hip strength and neuromuscular control  Decreased LE functional strength     Functional Activity Limitations (from functional questionnaire and intake):  Reduced ability to tolerate prolonged functional positions  Reduced ability or difficulty with changes of positions or transfers between positions  Reduced ability to perform lifting, reaching, carrying tasks  Reduced ability to squat  Reduced ability to forward bend    Participation Restrictions:   Reduced participation in self care  Reduced participation in home management   Reduced participation in social activities    Classification :   Signs/symptoms consistent

## 2024-02-01 ENCOUNTER — HOSPITAL ENCOUNTER (OUTPATIENT)
Dept: PHYSICAL THERAPY | Age: 39
Setting detail: THERAPIES SERIES
Discharge: HOME | End: 2024-02-01
Payer: COMMERCIAL

## 2024-02-01 PROCEDURE — 97110 THERAPEUTIC EXERCISES: CPT

## 2024-02-01 PROCEDURE — 97112 NEUROMUSCULAR REEDUCATION: CPT

## 2024-02-01 NOTE — FLOWSHEET NOTE
[facility]- Outpatient Rehabilitation and Therapy [address], [place] [address] office: [phone] fax: [phone]      Physical Therapy: TREATMENT/PROGRESS NOTE   Patient: Elizabeth Kingston (38 y.o. female)   Treatment Date: 2024   :  1985 MRN: [id number]   Visit #: 3   Insurance Allowable Auth Needed   BCBS []Yes    [x]No    Insurance: Payor: BCBS / Plan: BCBS - [place] PPO / Product Type: *No Product type* /   Insurance ID: [id number] - ([place] BCBS)  Secondary Insurance (if applicable):    Treatment Diagnosis:     ICD-10-CM    1. Chronic left shoulder pain  M25.512     G89.29       2. Chronic bilateral low back pain without sciatica  M54.50     G89.29            Medical Diagnosis:    Chronic left shoulder pain [M25.512, G89.29]  Chronic bilateral low back pain without sciatica [M54.50, G89.29]   Referring Physician: Yessenia Hernandez MD  PCP: Yessenia Hernandez MD                             Plan of care signed (Y/N): Yes - shoulder - Yes - back    Date of Patient follow up with Physician:      Progress Report/POC: NO  POC update due: (10 visits /OR AUTH LIMITS, whichever is less)  2024 - shoulder  - back    Preferred Language for Healthcare:   [x]English       []other:    SUBJECTIVE EXAMINATION     Patient Report/Comments: 2024 Pt notes on a daily basis she has less pain. She notes reaching in certain directions still causes discomfort.        Test used Initial score  2024   Pain Summary VAS 0-6/10 0-6/10   Functional questionnaire [place] Back Pain Disability Scale 33% deficit    Other: Upper Extremity functional Scale 29% deficit              OBJECTIVE EXAMINATION     Observation:     Test measurements: see eval    Exercises/Interventions:     Therapeutic Ex (33373)  Reps/Resistance Notes/Cues/Progressions   Bilateral ER 3 x 10 red ^2/    Pulleys 10x10\" Start 2            Cane ER 10x10\" Start 2 supine    10x10\"     Sidelying ER 3x10

## 2024-02-06 ENCOUNTER — HOSPITAL ENCOUNTER (OUTPATIENT)
Dept: PHYSICAL THERAPY | Age: 39
Setting detail: THERAPIES SERIES
Discharge: HOME | End: 2024-02-06
Payer: COMMERCIAL

## 2024-02-06 PROCEDURE — 97140 MANUAL THERAPY 1/> REGIONS: CPT

## 2024-02-06 PROCEDURE — 97110 THERAPEUTIC EXERCISES: CPT

## 2024-02-06 NOTE — FLOWSHEET NOTE
[facility]- Outpatient Rehabilitation and Therapy [address], [place] [address] office: [phone] fax: [phone]      Physical Therapy: TREATMENT/PROGRESS NOTE   Patient: Elizabeth Kingston (38 y.o. female)   Treatment Date: 2024   :  1985 MRN: [id number]   Visit #: 4   Insurance Allowable Auth Needed   BCBS []Yes    [x]No    Insurance: Payor: BCBS / Plan: BCBS - [place] PPO / Product Type: *No Product type* /   Insurance ID: [id number] - ([place] BCBS)  Secondary Insurance (if applicable):    Treatment Diagnosis:     ICD-10-CM    1. Chronic left shoulder pain  M25.512     G89.29       2. Chronic bilateral low back pain without sciatica  M54.50     G89.29            Medical Diagnosis:    Chronic left shoulder pain [M25.512, G89.29]  Chronic bilateral low back pain without sciatica [M54.50, G89.29]   Referring Physician: Yessenia Hernandez MD  PCP: Yessenia Hernandez MD                             Plan of care signed (Y/N): Yes - shoulder - Yes - back    Date of Patient follow up with Physician:      Progress Report/POC: NO  POC update due: (10 visits /OR AUTH LIMITS, whichever is less)  2024 - shoulder  - back    Preferred Language for Healthcare:   [x]English       []other:    SUBJECTIVE EXAMINATION     Patient Report/Comments: 2024 Pt notes that she feels pretty good today. She endorses minor low back soreness after horseback riding but overall feeling good.      Test used Initial score  2024   Pain Summary VAS 0-6/10 0-6/10   Functional questionnaire [place] Back Pain Disability Scale 33% deficit    Other: Upper Extremity functional Scale 29% deficit              OBJECTIVE EXAMINATION     Observation:     Test measurements: see eval    Exercises/Interventions:     Therapeutic Ex (37179)  Reps/Resistance Notes/Cues/Progressions   3 x 10 red ^    10x10\" Start            10x10\" Start  supine    10x10\"     3x10 Start  1#   3x10 Start

## 2024-02-08 ENCOUNTER — APPOINTMENT (OUTPATIENT)
Dept: PHYSICAL THERAPY | Age: 39
End: 2024-02-08
Payer: COMMERCIAL

## 2024-02-13 ENCOUNTER — HOSPITAL ENCOUNTER (OUTPATIENT)
Dept: PHYSICAL THERAPY | Age: 39
Setting detail: THERAPIES SERIES
Discharge: HOME | End: 2024-02-13
Payer: COMMERCIAL

## 2024-02-13 PROCEDURE — 97110 THERAPEUTIC EXERCISES: CPT

## 2024-02-13 PROCEDURE — 97140 MANUAL THERAPY 1/> REGIONS: CPT

## 2024-02-13 NOTE — FLOWSHEET NOTE
[facility]- Outpatient Rehabilitation and Therapy [address], [place] [address] office: [phone] fax: [phone]      Physical Therapy: TREATMENT/PROGRESS NOTE   Patient: Elizabeth Kingston (38 y.o. female)   Treatment Date: 2024   :  1985 MRN: [id number]   Visit #: 5   Insurance Allowable Auth Needed   BCBS []Yes    [x]No    Insurance: Payor: BCBS / Plan: BCBS [place] PPO / Product Type: *No Product type* /   Insurance ID: [id number] - ([place] BCBS)  Secondary Insurance (if applicable):    Treatment Diagnosis:     ICD-10-CM    1. Chronic left shoulder pain  M25.512     G89.29       2. Chronic bilateral low back pain without sciatica  M54.50     G89.29            Medical Diagnosis:    Chronic left shoulder pain [M25.512, G89.29]  Chronic bilateral low back pain without sciatica [M54.50, G89.29]   Referring Physician: Yessenia Hernandez MD  PCP: Yessenia Hernandez MD                             Plan of care signed (Y/N): Yes - shoulder - Yes - back    Date of Patient follow up with Physician:      Progress Report/POC: NO  POC update due: (10 visits /OR AUTH LIMITS, whichever is less)    2024 - shoulder    - back    Preferred Language for Healthcare:   [x]English       []other:    SUBJECTIVE EXAMINATION     Patient Report/Comments: 2024 Pt notes her shoulder has been feeling great. She endorses no real change for low back symptoms.      Test used Initial score  2024   Pain Summary VAS 0-6/10    Functional questionnaire [place] Back Pain Disability Scale 33% deficit 0-6/10   Other: Upper Extremity functional Scale 29% deficit 0-4/10             OBJECTIVE EXAMINATION     Observation:     Test measurements: see eval    Exercises/Interventions:     Therapeutic Ex (80593)  Reps/Resistance Notes/Cues/Progressions   Pulleys 10x10\" Start                                               Pelvic tilt march 3x10 ^ lower both at same time   [place]

## 2024-02-15 ENCOUNTER — HOSPITAL ENCOUNTER (OUTPATIENT)
Dept: PHYSICAL THERAPY | Age: 39
Setting detail: THERAPIES SERIES
Discharge: HOME | End: 2024-02-15
Payer: COMMERCIAL

## 2024-02-15 PROCEDURE — 97110 THERAPEUTIC EXERCISES: CPT

## 2024-02-15 PROCEDURE — 97112 NEUROMUSCULAR REEDUCATION: CPT

## 2024-02-15 NOTE — FLOWSHEET NOTE
[facility]- Outpatient Rehabilitation and Therapy [address], [place] [address] office: [phone] fax: [phone]      Physical Therapy: TREATMENT/PROGRESS NOTE   Patient: Elizabeth Kingston (38 y.o. female)   Treatment Date: 02/15/2024   :  1985 MRN: [id number]   Visit #: 6   Insurance Allowable Auth Needed   BCBS []Yes    [x]No    Insurance: Payor: BCBS / Plan: BCBS [place] PPO / Product Type: *No Product type* /   Insurance ID: [id number] - ([place] BCBS)  Secondary Insurance (if applicable):    Treatment Diagnosis:     ICD-10-CM    1. Chronic left shoulder pain  M25.512     G89.29       2. Chronic bilateral low back pain without sciatica  M54.50     G89.29            Medical Diagnosis:    Chronic left shoulder pain [M25.512, G89.29]  Chronic bilateral low back pain without sciatica [M54.50, G89.29]   Referring Physician: Yessenia Hernandez MD  PCP: Yessenia Hernandez MD                             Plan of care signed (Y/N): Yes - shoulder - Yes - back    Date of Patient follow up with Physician:      Progress Report/POC: NO  POC update due: (10 visits /OR AUTH LIMITS, whichever is less)    2024 - shoulder    - back    Preferred Language for Healthcare:   [x]English       []other:    SUBJECTIVE EXAMINATION     Patient Report/Comments: 2/15/2024 Pt notes that she feels good today. She was able to go horseback riding last Tuesday and was not sore at all afterwards.    Test used Initial score  1/30/24 02/15/2024   Pain Summary VAS 0-6/10    Functional questionnaire [place] Back Pain Disability Scale 33% deficit 0-6/10   Other: Upper Extremity functional Scale 29% deficit 0-4/10             OBJECTIVE EXAMINATION     Observation:     Test measurements: see eval    Exercises/Interventions:     Therapeutic Ex (14020)  Reps/Resistance Notes/Cues/Progressions   Bike AROM x5' Active warm up   Bilateral ER 3 x 10 green ^2/15   Pulleys 10x10\" Start    Cane ER 10x10\" Start

## 2024-02-20 ENCOUNTER — HOSPITAL ENCOUNTER (OUTPATIENT)
Dept: PHYSICAL THERAPY | Age: 39
Setting detail: THERAPIES SERIES
Discharge: HOME | End: 2024-02-20
Payer: COMMERCIAL

## 2024-02-20 PROCEDURE — 97110 THERAPEUTIC EXERCISES: CPT

## 2024-02-20 PROCEDURE — 97140 MANUAL THERAPY 1/> REGIONS: CPT

## 2024-02-20 NOTE — FLOWSHEET NOTE
[facility]- Outpatient Rehabilitation and Therapy [address], [place] [address] office: [phone] fax: [phone]      Physical Therapy: TREATMENT/PROGRESS NOTE   Patient: Elizabeth Kingston (38 y.o. female)   Treatment Date: 2024   :  1985 MRN: [id number]   Visit #: 7   Insurance Allowable Auth Needed   BCBS []Yes    [x]No    Insurance: Payor: BCBS / Plan: BCBS [place] PPO / Product Type: *No Product type* /   Insurance ID: [id number] - ([place] BCBS)  Secondary Insurance (if applicable):    Treatment Diagnosis:     ICD-10-CM    1. Chronic left shoulder pain  M25.512     G89.29       2. Chronic bilateral low back pain without sciatica  M54.50     G89.29            Medical Diagnosis:    Chronic left shoulder pain [M25.512, G89.29]  Chronic bilateral low back pain without sciatica [M54.50, G89.29]   Referring Physician: Yessenia Hernandez MD  PCP: Yessenia Hernandez MD                             Plan of care signed (Y/N): Yes - shoulder - Yes - back    Date of Patient follow up with Physician:      Progress Report/POC: NO  POC update due: (10 visits /OR AUTH LIMITS, whichever is less)    2024 - shoulder    - back    Preferred Language for Healthcare:   [x]English       []other:    SUBJECTIVE EXAMINATION     Patient Report/Comments: 2024 Pt notes that she felt really good after Thursday. However, pt went out of town over the weekend and after waking up in hotel has noticed increased stiffness and dull ache.      Test used Initial score  2024   Pain Summary VAS 0-6/10    Functional questionnaire [place] Back Pain Disability Scale 33% deficit 0-4/10   Other: Upper Extremity functional Scale 29% deficit 0-4/10             OBJECTIVE EXAMINATION     Observation:     Test measurements: see eval    Exercises/Interventions:     Therapeutic Ex (54343)  Reps/Resistance Notes/Cues/Progressions   Bike AROM x5' Active warm up   3 x 10 green ^2/15   10x10\"

## 2024-02-22 ENCOUNTER — HOSPITAL ENCOUNTER (OUTPATIENT)
Dept: PHYSICAL THERAPY | Age: 39
Setting detail: THERAPIES SERIES
Discharge: HOME | End: 2024-02-22
Payer: COMMERCIAL

## 2024-02-22 PROCEDURE — 97140 MANUAL THERAPY 1/> REGIONS: CPT

## 2024-02-22 PROCEDURE — 97110 THERAPEUTIC EXERCISES: CPT

## 2024-02-22 NOTE — PLAN OF CARE
normal function with self care, mobility, lifting and ambulation    TREATMENT PLAN   Plan: 2/22 f/u PRN for left shoulder    Electronically Signed by Kurtis Redman, PT              Date: 02/22/2024     Note: If patient does not return for scheduled/recommended follow up visits, this note will serve as a discharge from care along with the most recent update on progress.

## 2024-02-27 ENCOUNTER — HOSPITAL ENCOUNTER (OUTPATIENT)
Dept: PHYSICAL THERAPY | Age: 39
Setting detail: THERAPIES SERIES
Discharge: HOME | End: 2024-02-27
Payer: COMMERCIAL

## 2024-02-27 PROCEDURE — 97110 THERAPEUTIC EXERCISES: CPT

## 2024-02-27 PROCEDURE — 97530 THERAPEUTIC ACTIVITIES: CPT

## 2024-02-27 NOTE — FLOWSHEET NOTE
Minutes 40' 3     9:43-10:34    Total Treatment Minutes 51'        Charge Justification:  (39728) THERAPEUTIC EXERCISE - Provided verbal/tactile cueing for activities related to strengthening, flexibility, endurance, ROM performed to prevent loss of range of motion, maintain or improve muscular strength or increase flexibility, following either an injury or surgery.   (84624) HOME EXERCISE PROGRAM - Reviewed/Progressed HEP activities related to strengthening, flexibility, endurance, ROM performed to prevent loss of range of motion, maintain or improve muscular strength or increase flexibility, following either an injury or surgery.  (11036) NEUROMUSCULAR RE-EDUCATION - Therapeutic procedure, 1 or more areas, each 15 minutes; neuromuscular reeducation of movement, balance, coordination, kinesthetic sense, posture, and/or proprioception for sitting and/or standing activities  (75083) MANUAL THERAPY -  Manual therapy techniques, 1 or more regions, each 15 minutes (Mobilization/manipulation, manual lymphatic drainage, manual traction) for the purpose of modulating pain, promoting relaxation,  increasing ROM, reducing/eliminating soft tissue swelling/inflammation/restriction, improving soft tissue extensibility and allowing for proper ROM for normal function with self care, mobility, lifting and ambulation    TREATMENT PLAN   Plan: 2/27 Progress note NPV lumbar.     Electronically Signed by Kurtis Redman PT              Date: 02/27/2024     Note: If patient does not return for scheduled/recommended follow up visits, this note will serve as a discharge from care along with the most recent update on progress.

## 2024-02-29 ENCOUNTER — APPOINTMENT (OUTPATIENT)
Dept: PHYSICAL THERAPY | Age: 39
End: 2024-02-29
Payer: COMMERCIAL

## 2024-03-07 ENCOUNTER — HOSPITAL ENCOUNTER (OUTPATIENT)
Dept: PHYSICAL THERAPY | Age: 39
Setting detail: THERAPIES SERIES
Discharge: HOME | End: 2024-03-07
Payer: COMMERCIAL

## 2024-03-07 PROCEDURE — 97530 THERAPEUTIC ACTIVITIES: CPT

## 2024-03-07 PROCEDURE — 97110 THERAPEUTIC EXERCISES: CPT

## 2024-03-07 NOTE — DISCHARGE SUMMARY
[facility]- Outpatient Rehabilitation and Therapy [address], [place] [address] office: [phone] fax: [phone]     Physical Therapy Re-Certification Plan of Care    Dear Yessenia Hernandez MD,    We had the pleasure of treating the following patient for physical therapy services at [facility].  A summary of our findings can be found in the updated assessment below.  This includes our plan of care.  If you have any questions or concerns regarding these findings, please do not hesitate to contact me at the office phone number checked above.  Thank you for the referral.     Physician Signature:________________________________Date:__________________  By signing above (or electronic signature), therapist’s plan is approved by physician      Overall Response to Treatment:   []Patient is responding well to treatment and improvement is noted with regards  to goals   []Patient should continue to improve in reasonable time if they continue HEP   []Patient has plateaued and is no longer responding to skilled PT intervention    []Patient is getting worse and would benefit from return to referring MD   []Patient unable to adhere to initial POC   [x]Other: TRANSITION TO HEP/DC FORMAL PT: Patient is currently independent with symptom management and home exercise program. Patient reports understanding of the importance of continued compliance with home exercise program after discharge.  Patient has reached 9/10 long term goals and should reach all additional goals with compliance to home exercise program upon discharge.     Date range of previous POC Visits: -3/7    Total Visits: 5          Physical Therapy: TREATMENT/PROGRESS NOTE   Patient: Elizabeth Kingston (38 y.o. female)   Treatment Date: 2024   :  1985 MRN: [id number]   Visit #: 10   Insurance Allowable Auth Needed   BCBS []Yes    [x]No    Insurance: Payor: BCBS / Plan: BCBS - [place] PPO /